# Patient Record
Sex: MALE | Race: WHITE | NOT HISPANIC OR LATINO | Employment: UNEMPLOYED | URBAN - METROPOLITAN AREA
[De-identification: names, ages, dates, MRNs, and addresses within clinical notes are randomized per-mention and may not be internally consistent; named-entity substitution may affect disease eponyms.]

---
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
## 2025-04-02 ENCOUNTER — OFFICE VISIT (OUTPATIENT)
Dept: FAMILY MEDICINE | Facility: CLINIC | Age: 20
End: 2025-04-02
Payer: COMMERCIAL

## 2025-04-02 VITALS
BODY MASS INDEX: 22.4 KG/M2 | HEART RATE: 60 BPM | OXYGEN SATURATION: 99 % | RESPIRATION RATE: 14 BRPM | HEIGHT: 73 IN | WEIGHT: 169 LBS | DIASTOLIC BLOOD PRESSURE: 82 MMHG | TEMPERATURE: 98.7 F | SYSTOLIC BLOOD PRESSURE: 120 MMHG

## 2025-04-02 DIAGNOSIS — R11.0 NAUSEA: Primary | ICD-10-CM

## 2025-04-02 DIAGNOSIS — J34.89 RHINORRHEA: ICD-10-CM

## 2025-04-02 PROCEDURE — 3079F DIAST BP 80-89 MM HG: CPT | Performed by: FAMILY MEDICINE

## 2025-04-02 PROCEDURE — 90480 ADMN SARSCOV2 VAC 1/ONLY CMP: CPT | Performed by: FAMILY MEDICINE

## 2025-04-02 PROCEDURE — 3074F SYST BP LT 130 MM HG: CPT | Performed by: FAMILY MEDICINE

## 2025-04-02 PROCEDURE — 91320 SARSCV2 VAC 30MCG TRS-SUC IM: CPT | Performed by: FAMILY MEDICINE

## 2025-04-02 PROCEDURE — 99214 OFFICE O/P EST MOD 30 MIN: CPT | Performed by: FAMILY MEDICINE

## 2025-04-02 RX ORDER — FLUTICASONE PROPIONATE 50 MCG
2 SPRAY, SUSPENSION (ML) NASAL DAILY
Qty: 16 G | Refills: 11 | Status: SHIPPED | OUTPATIENT
Start: 2025-04-02

## 2025-04-02 NOTE — PROGRESS NOTES
"Assessment/Plan    Problem   Rhinorrhea    4/2/2025  6+ years. Watery. Sometimes thicker and causes cough. Bilateral. No consistent triggers, except worse in winter.    No hx allergies. Happened even when traveling to [place]. No hx major nose trauma.    Tried allegra for a month. No relief.    Could be vasomotor or allergies.  -stop allegra  -start flonase  -if no improvement in 1 month, will send to ent     Nausea    4/2/2025  Morning nausea. Doesn't wake w/ nausea, but notices when he tries to eat breakfast. Episodes resolve w/in a couple hrs. Episodes occur about once every 2 wks.    No associated emesis, abdominal pain, weight loss.    Onset about a yr ago. Was student at [employer] at the time and disliked the place.    Denies recent gerd. Denies correlation w/ stress. Etoh use rare. Cannabis daily; wasn't using when symptoms began. Denies supplement use.    No \"red flags.\" Differential includes anxiety, gerd, cannabis hyperemesis-melissa syndrome.  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     Nasal passages normal  no frontal or maxillary sinus tenderness  oropharynx without abnormal masses  Palpable cervical nodes b/l, symmetric  lung " fields CTAB

## 2025-04-30 PROBLEM — M85.80 DELAYED BONE AGE: Status: RESOLVED | Noted: 2019-06-11 | Resolved: 2025-04-30

## 2025-06-13 NOTE — TELEPHONE ENCOUNTER
Forms completed, signed, copy made for chart and emailed to [email or web address]    Ina Kinsey,        Peripheral IV  Date/Time: 6/13/2025 8:30 AM  Inserted by: ARIC Hinton-CRNA    Placement  Needle size: 18 G  Laterality: right  Location: hand  Local anesthetic: none  Site prep: alcohol  Technique: anatomical landmarks  Attempts: 1

## 2025-06-18 ENCOUNTER — PATIENT OUTREACH (OUTPATIENT)
Dept: CARE COORDINATION | Facility: CLINIC | Age: 20
End: 2025-06-18
Payer: COMMERCIAL

## 2025-07-07 ENCOUNTER — PATIENT OUTREACH (OUTPATIENT)
Dept: CARE COORDINATION | Facility: CLINIC | Age: 20
End: 2025-07-07
Payer: COMMERCIAL

## 2025-07-22 NOTE — TELEPHONE ENCOUNTER
Reason for Visit: Rhinorrhea    Date of Appointment: 10/17/2025   Notes Status Description   Office Notes from Referring Provider Gin  PRIMARY CARE   6/12/2025 mychart, 4/2/2025 OV: Angel Isbell MD

## 2025-07-31 ENCOUNTER — OFFICE VISIT (OUTPATIENT)
Dept: FAMILY MEDICINE | Facility: CLINIC | Age: 20
End: 2025-07-31
Payer: COMMERCIAL

## 2025-07-31 VITALS
SYSTOLIC BLOOD PRESSURE: 138 MMHG | WEIGHT: 174 LBS | BODY MASS INDEX: 23.06 KG/M2 | OXYGEN SATURATION: 98 % | TEMPERATURE: 98.1 F | RESPIRATION RATE: 14 BRPM | HEART RATE: 88 BPM | DIASTOLIC BLOOD PRESSURE: 68 MMHG | HEIGHT: 73 IN

## 2025-07-31 DIAGNOSIS — B07.0 PLANTAR WARTS: ICD-10-CM

## 2025-07-31 DIAGNOSIS — Z00.00 ENCOUNTER FOR ANNUAL PHYSICAL EXAM: Primary | ICD-10-CM

## 2025-07-31 SDOH — HEALTH STABILITY: PHYSICAL HEALTH: ON AVERAGE, HOW MANY MINUTES DO YOU ENGAGE IN EXERCISE AT THIS LEVEL?: 20 MIN

## 2025-07-31 SDOH — HEALTH STABILITY: PHYSICAL HEALTH: ON AVERAGE, HOW MANY DAYS PER WEEK DO YOU ENGAGE IN MODERATE TO STRENUOUS EXERCISE (LIKE A BRISK WALK)?: 5 DAYS

## 2025-07-31 ASSESSMENT — PAIN SCALES - GENERAL: PAINLEVEL_OUTOF10: NO PAIN (0)

## 2025-07-31 ASSESSMENT — SOCIAL DETERMINANTS OF HEALTH (SDOH): HOW OFTEN DO YOU GET TOGETHER WITH FRIENDS OR RELATIVES?: ONCE A WEEK

## 2025-10-17 ENCOUNTER — PRE VISIT (OUTPATIENT)
Dept: OTOLARYNGOLOGY | Facility: CLINIC | Age: 20
End: 2025-10-17